# Patient Record
Sex: MALE | Race: WHITE | NOT HISPANIC OR LATINO | Employment: OTHER | URBAN - NONMETROPOLITAN AREA
[De-identification: names, ages, dates, MRNs, and addresses within clinical notes are randomized per-mention and may not be internally consistent; named-entity substitution may affect disease eponyms.]

---

## 2023-01-08 ENCOUNTER — OFFICE VISIT (OUTPATIENT)
Dept: FAMILY MEDICINE | Facility: OTHER | Age: 68
End: 2023-01-08
Attending: FAMILY MEDICINE
Payer: MEDICARE

## 2023-01-08 VITALS
RESPIRATION RATE: 16 BRPM | TEMPERATURE: 96.8 F | WEIGHT: 169.2 LBS | HEART RATE: 74 BPM | OXYGEN SATURATION: 93 % | BODY MASS INDEX: 26.56 KG/M2 | SYSTOLIC BLOOD PRESSURE: 130 MMHG | DIASTOLIC BLOOD PRESSURE: 60 MMHG | HEIGHT: 67 IN

## 2023-01-08 DIAGNOSIS — H57.89 IRRITATION OF RIGHT EYE: ICD-10-CM

## 2023-01-08 DIAGNOSIS — S05.01XA ABRASION OF RIGHT CORNEA, INITIAL ENCOUNTER: Primary | ICD-10-CM

## 2023-01-08 DIAGNOSIS — Z23 NEED FOR TDAP VACCINATION: ICD-10-CM

## 2023-01-08 PROCEDURE — 90471 IMMUNIZATION ADMIN: CPT

## 2023-01-08 PROCEDURE — G0463 HOSPITAL OUTPT CLINIC VISIT: HCPCS

## 2023-01-08 PROCEDURE — G0463 HOSPITAL OUTPT CLINIC VISIT: HCPCS | Mod: 25

## 2023-01-08 PROCEDURE — 250N000009 HC RX 250

## 2023-01-08 PROCEDURE — 99203 OFFICE O/P NEW LOW 30 MIN: CPT

## 2023-01-08 PROCEDURE — 250N000013 HC RX MED GY IP 250 OP 250 PS 637

## 2023-01-08 RX ORDER — TETRACAINE HYDROCHLORIDE 5 MG/ML
1-2 SOLUTION OPHTHALMIC ONCE
Status: COMPLETED | OUTPATIENT
Start: 2023-01-08 | End: 2023-01-08

## 2023-01-08 RX ORDER — ASPIRIN 325 MG
325 TABLET ORAL DAILY
COMMUNITY

## 2023-01-08 RX ORDER — SODIUM CHLORIDE FOR INHALATION 0.9 %
3 VIAL, NEBULIZER (ML) INHALATION
Status: ACTIVE | OUTPATIENT
Start: 2023-01-08

## 2023-01-08 RX ORDER — ERYTHROMYCIN 5 MG/G
0.5 OINTMENT OPHTHALMIC 4 TIMES DAILY
Qty: 10 G | Refills: 0 | Status: SHIPPED | OUTPATIENT
Start: 2023-01-08 | End: 2023-01-13

## 2023-01-08 RX ADMIN — TETRACAINE HYDROCHLORIDE 2 DROP: 5 SOLUTION OPHTHALMIC at 11:37

## 2023-01-08 RX ADMIN — ISODIUM CHLORIDE 3 ML: 0.03 SOLUTION RESPIRATORY (INHALATION) at 11:44

## 2023-01-08 RX ADMIN — FLUORESCEIN SODIUM 1 STRIP: 1 STRIP OPHTHALMIC at 11:36

## 2023-01-08 ASSESSMENT — PAIN SCALES - GENERAL: PAINLEVEL: WORST PAIN (10)

## 2023-01-08 NOTE — PROGRESS NOTES
ASSESSMENT/PLAN:    I have reviewed the nursing notes.  I have reviewed the findings, diagnosis, plan and need for follow up with the patient.    1. Abrasion of right cornea, initial encounter  2. Irritation of right eye  - sodium chloride 0.9 % neb solution 3 mL  - fluorescein (FUL-ARIN) ophthalmic strip 1 strip  - tetracaine (PONTOCAINE) 0.5 % ophthalmic solution 1-2 drop  - erythromycin (ROMYCIN) 5 MG/GM ophthalmic ointment; Place 0.5 inches into the right eye 4 times daily for 5 days  Dispense: 10 g; Refill: 0    Physical exam and symptoms consistent with right eye corneal abrasions. No foreign body noted in right eye. Vision is still intact with some mild blurriness. Attempted twice to contact local on-call ophthalmologist without success. Patient did not want to wait any longer for call back from ophthalmologist. Will treat corneal abrasion with ophthalmic antibiotic ointment. May use over-the-counter Tylenol or ibuprofen PRN for eye pain. Advised patient to contact his local ophthalmologist tomorrow to be further evaluated as soon as possible. Will call patient if ophthalmologist calls back and has different recommendations for treatment.     3. Need for Tdap vaccination  - TDAP VACCINE (Adacel, Boostrix)  [[id number]]    Discussed warning signs/symptoms indicative of need to f/u    Follow up if symptoms persist or worsen or concerns    I explained my diagnostic considerations and recommendations to the patient, who voiced understanding and agreement with the treatment plan. All questions were answered. We discussed potential side effects of any prescribed or recommended therapies, as well as expectations for response to treatments.    Kishan Mcdaniel, ALON CNP  1/8/2023  11:12 AM    HPI:    Alba Goss is a 67 year old male  who presents to [facility] today for concerns of right eye irritation    right eye complaint/concern.     Eye Sx: discharge/drainage, swelling, pain, redness, change in  "vision  Problem/Context: Possible foreign body  History: something flew into eye (wood chip), patient was cutting wood on Friday (2 days ago) and a piece of wood flew into his right eye.He has had eye irritation and pain since that time. He states he has sensitivity to light and some eye blurriness and clear watery drainage.     Contact or glasses use: YES - glasses for reading  Changes in vision: YES - some blurriness  Change in eye ROM: No  Presence of Flashers/Floaters: No  Discharge description: watery  Presence of URI Symptoms: No  Eyelid (any symptoms): YES - mild swelling  Any dental or jaw pain: No  Any exposure to trauma or chemical burns to eye: YES - wood chip flew into his right eye    Treatments Tried: OTC Drops  Flushing with water - not successful at relieving symptoms  Tylenol    Prior eye or sinus surgeries: No  Similar symptoms in the past: YES but he has been able to flush his eyes and get the foreign body out    PCP: none      History reviewed. No pertinent past medical history.  History reviewed. No pertinent surgical history.  Social History     Tobacco Use     Smoking status: Every Day     Packs/day: 0.50     Years: 55.00     Pack years: 27.50     Types: Cigarettes     Smokeless tobacco: Not on file   Substance Use Topics     Alcohol use: No     Current Outpatient Medications   Medication Sig Dispense Refill     aspirin (ASA) 325 MG tablet Take 325 mg by mouth daily       No Known Allergies  Past medical history, past surgical history, current medications and allergies reviewed and accurate to the best of my knowledge.      ROS:  Refer to HPI    /60   Pulse 74   Temp 96.8  F (36  C) (Tympanic)   Resp 16   Ht 1.708 m (5' 7.25\")   Wt 76.7 kg (169 lb 3.2 oz)   SpO2 93%   BMI 26.30 kg/m      EXAM:  General Appearance: Well appearing 67 year old male, appropriate appearance for age. No acute distress   Ears: Normal external ears, non tender.  Eyes: right conjunctivae with moderate " erythema and irritation, clear, watery drainage, mild right eyelid swelling, pupils equal, left eye clear with no redness, irritation, or drainage. Slit lamp exam shows approximate 4mm abrasion of the right cornea at the 12 o'clock position above his pupil, there are also two smaller (approximately 0.5-1mm) corneal abrasions at the 6'o clock position below the pupil on the iris.  Oropharynx: moist mucous membranes, voice clear.    Sinuses:  No sinus tenderness upon palpation of the frontal or maxillary sinuses  Respiratory: normal chest wall and respirations.  Normal effort.  Clear to auscultation bilaterally, no wheezing, crackles or rhonchi.  No increased work of breathing.  No cough appreciated.  Cardiac: RRR with no murmurs  Musculoskeletal:  Equal movement of bilateral upper extremities.  Equal movement of bilateral lower extremities.  Normal gait.    Dermatological: no rashes noted of exposed skin  Neuro: Alert and oriented to person, place, and time.    Psychological: normal affect, alert, oriented, and pleasant.     Eye Fluorescein Examination  Indication: Sensation of foreign body  Procedure: The patient's head was positioned appropriately to provide adequate exposure of the right eye using a slit lamp. Eye flushes with normal saline, no foreign body noted. Anesthesia was tetracaine drops.  Fluorescein staining was Fluorescein ophthalmic strip  The patient tolerated the procedure well.  Complications: None

## 2023-01-08 NOTE — PATIENT INSTRUCTIONS
Follow-up with your local eye doctor as soon as possible for further evaluation    Take ibuprofen and tylenol for pain

## 2025-06-23 ENCOUNTER — OFFICE VISIT (OUTPATIENT)
Dept: FAMILY MEDICINE | Facility: OTHER | Age: 70
End: 2025-06-23
Attending: NURSE PRACTITIONER
Payer: MEDICARE

## 2025-06-23 VITALS
HEART RATE: 62 BPM | WEIGHT: 171 LBS | SYSTOLIC BLOOD PRESSURE: 154 MMHG | DIASTOLIC BLOOD PRESSURE: 78 MMHG | OXYGEN SATURATION: 95 % | BODY MASS INDEX: 26.84 KG/M2 | TEMPERATURE: 98.8 F | HEIGHT: 67 IN | RESPIRATION RATE: 19 BRPM

## 2025-06-23 DIAGNOSIS — L60.2 NAIL THICKENING: ICD-10-CM

## 2025-06-23 DIAGNOSIS — B35.1 ONYCHOMYCOSIS: Primary | ICD-10-CM

## 2025-06-23 PROCEDURE — 1125F AMNT PAIN NOTED PAIN PRSNT: CPT

## 2025-06-23 PROCEDURE — G0463 HOSPITAL OUTPT CLINIC VISIT: HCPCS

## 2025-06-23 PROCEDURE — 3077F SYST BP >= 140 MM HG: CPT

## 2025-06-23 PROCEDURE — 99212 OFFICE O/P EST SF 10 MIN: CPT

## 2025-06-23 PROCEDURE — 3078F DIAST BP <80 MM HG: CPT

## 2025-06-23 ASSESSMENT — ENCOUNTER SYMPTOMS
CARDIOVASCULAR NEGATIVE: 1
RESPIRATORY NEGATIVE: 1
CONSTITUTIONAL NEGATIVE: 1

## 2025-06-23 ASSESSMENT — PAIN SCALES - GENERAL: PAINLEVEL_OUTOF10: SEVERE PAIN (9)

## 2025-06-23 NOTE — PROGRESS NOTES
"Chief Complaint   Patient presents with    Musculoskeletal Problem   Patient is here to be seen for toe on right foot.    FOOD SECURITY SCREENING QUESTIONS  Hunger Vital Signs:  Within the past 12 months we worried whether our food would run out before we got money to buy more. Never  Within the past 12 months the food we bought just didn't last and we didn't have money to get more. Never  Audrey Wolf 6/23/2025 4:45 PM      Initial BP (!) 154/78 (BP Location: Left arm, Patient Position: Sitting, Cuff Size: Adult Regular)   Pulse 62   Temp 98.8  F (37.1  C) (Tympanic)   Resp 19   Ht 1.702 m (5' 7\")   Wt 77.6 kg (171 lb)   SpO2 95%   BMI 26.78 kg/m   Estimated body mass index is 26.78 kg/m  as calculated from the following:    Height as of this encounter: 1.702 m (5' 7\").    Weight as of this encounter: 77.6 kg (171 lb).  Medication Reconciliation: complete    Audrey Wolf   "

## 2025-06-23 NOTE — PROGRESS NOTES
Alba Goss  1955    ASSESSMENT/PLAN      1. Onychomycosis (Primary)  2. Nail thickening    - Recommended patient see [id number] which is a home health agency who does foot care.   - No signs of bacterial infection on exam.   - May use over-the-counter Tylenol or ibuprofen PRN  - Follow up as needed for new or worsening symptoms          *Explanation of diagnosis, treatment options and risk and benefits of medications reviewed with patient. Patient agrees with plan of care.  *All questions were answered.    *Red flags symptoms were discussed and patient was advised when they should return for reevaluation or for prompt emergency evaluation.   *Patient was given verbal and written instructions on plan of care. Instructions were printed or are available on [employer] on electronic AVS.   *We discussed potential side effects of any prescribed or recommended therapies, as well as expectations for response to treatments.  *Patient discharged in stable condition    Kishore Osuna, VIDAL, APRN, FNP-C  [facility]    SUBJECTIVE  CHIEF COMPLAINT/ REASON FOR VISIT  Patient presents with:  Musculoskeletal Problem     HISTORY OF PRESENT ILLNESS  Alba Goss is a pleasant 69 year old male [facility]. Patient states his left great toenail is enlarged and causing pain. He denies any drainage, numbness, or tingling. He does not regularly see someone for care of his feet. He is concerned about infection.     History provided by patient       I have reviewed the nursing notes.  I have reviewed allergies, medication list, problem list, and past medical history.    REVIEW OF SYSTEMS  Review of Systems   Constitutional: Negative.    Respiratory: Negative.     Cardiovascular: Negative.    Skin:  Negative for rash.        VITAL SIGNS  Vitals:    06/23/25 1644   BP: (!) 154/78   BP Location: Left arm   Patient Position: Sitting   Cuff Size: Adult Regular  "  Pulse: 62   Resp: 19   Temp: 98.8  F (37.1  C)   TempSrc: Tympanic   SpO2: 95%   Weight: 77.6 kg (171 lb)   Height: 1.702 m (5' 7\")      Body mass index is 26.78 kg/m .      OBJECTIVE  PHYSICAL EXAM  Physical Exam  Vitals and nursing note reviewed.   Constitutional:       General: He is not in acute distress.     Appearance: Normal appearance. He is normal weight. He is not ill-appearing or toxic-appearing.   Feet:      Left foot:      Skin integrity: Callus and dry skin present. No erythema.      Toenail Condition: Left toenails are abnormally thick. Fungal disease present.  Neurological:      Mental Status: He is alert.            DIAGNOSTICS     "

## (undated) RX ORDER — TETRACAINE HYDROCHLORIDE 5 MG/ML
SOLUTION OPHTHALMIC
Status: DISPENSED
Start: 2023-01-08

## (undated) RX ORDER — SODIUM CHLORIDE FOR INHALATION 0.9 %
VIAL, NEBULIZER (ML) INHALATION
Status: DISPENSED
Start: 2023-01-08